# Patient Record
Sex: FEMALE | Race: WHITE | NOT HISPANIC OR LATINO | URBAN - METROPOLITAN AREA
[De-identification: names, ages, dates, MRNs, and addresses within clinical notes are randomized per-mention and may not be internally consistent; named-entity substitution may affect disease eponyms.]

---

## 2017-08-07 ENCOUNTER — FOLLOW UP ESTABLISHED (OUTPATIENT)
Dept: URBAN - METROPOLITAN AREA CLINIC 51 | Facility: CLINIC | Age: 82
End: 2017-08-07
Payer: MEDICARE

## 2017-08-07 DIAGNOSIS — H43.393 OTHER VITREOUS OPACITIES, BILATERAL: ICD-10-CM

## 2017-08-07 PROCEDURE — 92134 CPTRZ OPH DX IMG PST SGM RTA: CPT | Performed by: OPTOMETRIST

## 2017-08-07 PROCEDURE — 92014 COMPRE OPH EXAM EST PT 1/>: CPT | Performed by: OPTOMETRIST

## 2017-08-07 ASSESSMENT — VISUAL ACUITY
OD: 20/40
OS: 20/30

## 2017-08-07 ASSESSMENT — KERATOMETRY
OS: 43.56
OD: 43.52

## 2017-08-07 ASSESSMENT — INTRAOCULAR PRESSURE
OD: 16
OS: 16

## 2018-11-08 ENCOUNTER — FOLLOW UP ESTABLISHED (OUTPATIENT)
Dept: URBAN - METROPOLITAN AREA CLINIC 51 | Facility: CLINIC | Age: 83
End: 2018-11-08
Payer: MEDICARE

## 2018-11-08 DIAGNOSIS — H04.123 TEAR FILM INSUFFICIENCY OF BILATERAL LACRIMAL GLANDS: ICD-10-CM

## 2018-11-08 DIAGNOSIS — H26.493 OTHER SECONDARY CATARACT, BILATERAL: Primary | ICD-10-CM

## 2018-11-08 DIAGNOSIS — H52.4 PRESBYOPIA: ICD-10-CM

## 2018-11-08 PROCEDURE — 92014 COMPRE OPH EXAM EST PT 1/>: CPT | Performed by: OPTOMETRIST

## 2018-11-08 PROCEDURE — 92134 CPTRZ OPH DX IMG PST SGM RTA: CPT | Performed by: OPTOMETRIST

## 2018-11-08 ASSESSMENT — INTRAOCULAR PRESSURE
OS: 20
OD: 21

## 2018-11-08 ASSESSMENT — VISUAL ACUITY
OS: 20/25
OD: 20/20

## 2018-12-13 ENCOUNTER — Encounter (OUTPATIENT)
Dept: URBAN - METROPOLITAN AREA CLINIC 51 | Facility: CLINIC | Age: 83
End: 2018-12-13
Payer: MEDICARE

## 2018-12-13 DIAGNOSIS — Z01.818 ENCOUNTER FOR OTHER PREPROCEDURAL EXAMINATION: Primary | ICD-10-CM

## 2018-12-13 PROCEDURE — 99213 OFFICE O/P EST LOW 20 MIN: CPT | Performed by: PHYSICIAN ASSISTANT

## 2018-12-18 ENCOUNTER — SURGERY (OUTPATIENT)
Dept: URBAN - METROPOLITAN AREA SURGERY 19 | Facility: SURGERY | Age: 83
End: 2018-12-18
Payer: MEDICARE

## 2018-12-18 PROCEDURE — 66821 AFTER CATARACT LASER SURGERY: CPT | Performed by: OPHTHALMOLOGY

## 2019-01-09 ENCOUNTER — SURGERY (OUTPATIENT)
Dept: URBAN - METROPOLITAN AREA SURGERY 19 | Facility: SURGERY | Age: 84
End: 2019-01-09
Payer: MEDICARE

## 2019-01-09 PROCEDURE — 66821 AFTER CATARACT LASER SURGERY: CPT | Performed by: OPHTHALMOLOGY

## 2019-11-11 ENCOUNTER — FOLLOW UP ESTABLISHED (OUTPATIENT)
Dept: URBAN - METROPOLITAN AREA CLINIC 51 | Facility: CLINIC | Age: 84
End: 2019-11-11
Payer: MEDICARE

## 2019-11-11 PROCEDURE — 92014 COMPRE OPH EXAM EST PT 1/>: CPT | Performed by: OPTOMETRIST

## 2019-11-11 PROCEDURE — 92015 DETERMINE REFRACTIVE STATE: CPT | Performed by: OPTOMETRIST

## 2019-11-11 ASSESSMENT — VISUAL ACUITY
OS: 20/25
OD: 20/25

## 2019-11-11 ASSESSMENT — KERATOMETRY
OS: 43.73
OD: 43.55

## 2019-11-11 ASSESSMENT — INTRAOCULAR PRESSURE
OD: 14
OS: 15

## 2021-12-28 ENCOUNTER — OFFICE VISIT (OUTPATIENT)
Dept: URBAN - METROPOLITAN AREA CLINIC 51 | Facility: CLINIC | Age: 86
End: 2021-12-28
Payer: MEDICARE

## 2021-12-28 DIAGNOSIS — Z96.1 PRESENCE OF PSEUDOPHAKIA: ICD-10-CM

## 2021-12-28 DIAGNOSIS — H52.13 MYOPIA, BILATERAL: ICD-10-CM

## 2021-12-28 DIAGNOSIS — H43.813 VITREOUS DEGENERATION, BILATERAL: ICD-10-CM

## 2021-12-28 PROCEDURE — 92134 CPTRZ OPH DX IMG PST SGM RTA: CPT | Performed by: OPTOMETRIST

## 2021-12-28 PROCEDURE — 92014 COMPRE OPH EXAM EST PT 1/>: CPT | Performed by: OPTOMETRIST

## 2021-12-28 ASSESSMENT — INTRAOCULAR PRESSURE
OD: 15
OS: 14

## 2021-12-28 ASSESSMENT — VISUAL ACUITY
OD: 20/20
OS: 20/20

## 2021-12-28 ASSESSMENT — KERATOMETRY
OD: 43.30
OS: 43.95

## 2023-06-15 ENCOUNTER — OFFICE VISIT (OUTPATIENT)
Dept: URBAN - METROPOLITAN AREA CLINIC 51 | Facility: CLINIC | Age: 88
End: 2023-06-15
Payer: MEDICARE

## 2023-06-15 DIAGNOSIS — H01.112 ALLERGIC DERMATITIS OF RIGHT LOWER EYELID: Primary | ICD-10-CM

## 2023-06-15 PROCEDURE — 99213 OFFICE O/P EST LOW 20 MIN: CPT | Performed by: OPTOMETRIST

## 2023-06-15 RX ORDER — NEOMYCIN SULFATE, POLYMYXIN B SULFATE AND DEXAMETHASONE 3.5; 10000; 1 MG/G; [USP'U]/G; MG/G
OINTMENT OPHTHALMIC
Qty: 3.5 | Refills: 1 | Status: ACTIVE
Start: 2023-06-15

## 2023-06-15 ASSESSMENT — INTRAOCULAR PRESSURE
OS: 13
OD: 14

## 2023-06-15 NOTE — IMPRESSION/PLAN
Impression: Allergic dermatitis of right lower eyelid: H01.112.  Plan: use mild cleanser 
pat dry 
maxitrol alayna 
may use aqufor 
handout provided

## 2024-08-21 ENCOUNTER — OFFICE VISIT (OUTPATIENT)
Dept: URBAN - METROPOLITAN AREA CLINIC 51 | Facility: CLINIC | Age: 89
End: 2024-08-21
Payer: MEDICARE

## 2024-08-21 DIAGNOSIS — H52.4 PRESBYOPIA: ICD-10-CM

## 2024-08-21 DIAGNOSIS — H44.23 DEGENERATIVE MYOPIA, BILATERAL: Primary | ICD-10-CM

## 2024-08-21 DIAGNOSIS — Z96.1 PRESENCE OF INTRAOCULAR LENS: ICD-10-CM

## 2024-08-21 DIAGNOSIS — H43.813 VITREOUS DEGENERATION, BILATERAL: ICD-10-CM

## 2024-08-21 PROCEDURE — 92134 CPTRZ OPH DX IMG PST SGM RTA: CPT | Performed by: OPTOMETRIST

## 2024-08-21 PROCEDURE — 92014 COMPRE OPH EXAM EST PT 1/>: CPT | Performed by: OPTOMETRIST

## 2024-08-21 ASSESSMENT — INTRAOCULAR PRESSURE
OS: 16
OD: 18

## 2024-08-21 ASSESSMENT — VISUAL ACUITY
OD: 20/20
OS: 20/25

## 2024-08-21 ASSESSMENT — KERATOMETRY: OD: 43.75
